# Patient Record
Sex: MALE | Race: WHITE | NOT HISPANIC OR LATINO | Employment: UNEMPLOYED | URBAN - NONMETROPOLITAN AREA
[De-identification: names, ages, dates, MRNs, and addresses within clinical notes are randomized per-mention and may not be internally consistent; named-entity substitution may affect disease eponyms.]

---
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
## 2021-01-14 RX ORDER — PROPRANOLOL HYDROCHLORIDE 10 MG/1
TABLET ORAL
Qty: 60 TABLET | Refills: 1 | OUTPATIENT
Start: 2021-01-14

## 2021-01-25 ENCOUNTER — OFFICE VISIT (OUTPATIENT)
Dept: PSYCHIATRY | Facility: CLINIC | Age: 21
End: 2021-01-25

## 2021-01-25 VITALS
WEIGHT: 157 LBS | DIASTOLIC BLOOD PRESSURE: 61 MMHG | HEIGHT: 71 IN | BODY MASS INDEX: 21.98 KG/M2 | SYSTOLIC BLOOD PRESSURE: 123 MMHG | TEMPERATURE: 97.5 F | HEART RATE: 68 BPM

## 2021-01-25 DIAGNOSIS — Z79.899 MEDICATION MANAGEMENT: ICD-10-CM

## 2021-01-25 DIAGNOSIS — F84.0 HISTORY OF AUTISM SPECTRUM DISORDER: ICD-10-CM

## 2021-01-25 DIAGNOSIS — F39 MOOD DISORDER IN PARTIAL REMISSION (HCC): Primary | ICD-10-CM

## 2021-01-25 DIAGNOSIS — R45.1 AGITATION: ICD-10-CM

## 2021-01-25 DIAGNOSIS — F63.9 IMPULSE CONTROL DISORDER: ICD-10-CM

## 2021-01-25 PROCEDURE — 99214 OFFICE O/P EST MOD 30 MIN: CPT | Performed by: NURSE PRACTITIONER

## 2021-01-25 RX ORDER — PAROXETINE HYDROCHLORIDE 40 MG/1
40 TABLET, FILM COATED ORAL
Qty: 30 TABLET | Refills: 1 | Status: SHIPPED | OUTPATIENT
Start: 2021-01-25 | End: 2021-03-08 | Stop reason: SDUPTHER

## 2021-01-25 RX ORDER — ZIPRASIDONE HYDROCHLORIDE 40 MG/1
40 CAPSULE ORAL EVERY EVENING
Qty: 30 CAPSULE | Refills: 1 | Status: SHIPPED | OUTPATIENT
Start: 2021-01-25 | End: 2021-03-08 | Stop reason: SDUPTHER

## 2021-01-25 RX ORDER — DIVALPROEX SODIUM 500 MG/1
500 TABLET, EXTENDED RELEASE ORAL NIGHTLY
Qty: 30 TABLET | Refills: 2 | Status: SHIPPED | OUTPATIENT
Start: 2021-01-25 | End: 2021-03-08 | Stop reason: SDUPTHER

## 2021-01-25 NOTE — PROGRESS NOTES
"Subjective   Ismael Sal Jr. is a 20 y.o. male who arrives with his mother today for his 1 month follow up for medication  management    Chief Complaint:  Medication follow-up    History of Present Illness:   Ismael and his mom agree everything is going well.  Mom states she is not having to \"redirect\" Ismael as much.  Mom states Ismael  spends a lot of time on his phone.  She was planning to put him in a mentoring program, but Covid interfered. He has little opportunity for outside socialization but texts his friends and listens to music.  Ismael states his sleep is \"fine.\" Denies problems initiating and maintaining sleep.  Duration averages about 8 hours.  Denies nightmares.  Mom states his appetite has increased and he eats regular meals.  No weight gain or weight loss.  Continues to provide care to for his grandfather and nephews. Mom states he sometimes gets frustrated when babysitting his nephews. Denies  anger outbursts.  Ismael's PHQ score is 0.  He rates anxiety as 0/10, hopelessness 0/10.  Both mom and Ismael deny any needs that need further addressed at this time.     PHQ-9 Depression Screening  Little interest or pleasure in doing things? 0   Feeling down, depressed, or hopeless? 0   Trouble falling or staying asleep, or sleeping too much?     Feeling tired or having little energy?     Poor appetite or overeating?     Feeling bad about yourself - or that you are a failure or have let yourself or your family down?     Trouble concentrating on things, such as reading the newspaper or watching television?     Moving or speaking so slowly that other people could have noticed? Or the opposite - being so fidgety or restless that you have been moving around a lot more than usual?     Thoughts that you would be better off dead, or of hurting yourself in some way?     PHQ-9 Total Score 0   If you checked off any problems, how difficult have these problems made it for you to do your work, take care of things at home, or get " along with other people?       Medication treatment options discussed.  Aims test conducted. Fine Intermittent bilateral hand tremor observed.  Does not appear to have worsened since discontinuing propanolol.  BP has improved.  Ismael does not find the tremor bothersome.  Ismael states he has been taking only 500 mg of Depakote at night,  which seems to be controlling his agitation.  He continues to take Paxil 40 mg p.o. at bedtime and Geodon 40 mg p.o. at bedtime.  He denies medication side effects.  Mom and Ismael find his current medication regimen effective and prefer no medication changes to be made at this time.   Mom agrees to follow-up with provider in 6 weeks and agrees to call clinic if she feels he needs to be seen sooner.    He is a non-smoker.    Mau report reviewed and appropriate.  Ismael denies any alcohol or substance use.    The following portions of the patient's history were reviewed and updated as appropriate: allergies, current medications, past family history, past medical history, past social history, past surgical history and problem list.    Problem List:  Patient Active Problem List   Diagnosis   • Bipolar disorder (CMS/[facility])   • Depression with suicidal ideation   • MDD (major depressive disorder)   • Impulse control disorder     Allergy:   Allergies   Allergen Reactions   • Bee Venom Hives   • Sulfa Antibiotics Hives      Current Medications:   Current Outpatient Medications   Medication Sig Dispense Refill   • divalproex (DEPAKOTE ER) 500 MG 24 hr tablet Take 1 tablet by mouth Every Night. 30 tablet 2   • PARoxetine (PAXIL) 40 MG tablet Take 1 tablet by mouth every night at bedtime. 30 tablet 1   • ziprasidone (GEODON) 40 MG capsule Take 1 capsule by mouth Every Evening. With Food 30 capsule 1     No current facility-administered medications for this visit.      Review of Symptoms:    Review of Systems   Allergic/Immunologic: Positive for environmental allergies.   Neurological: Positive for  "memory problem.   Psychiatric/Behavioral: Positive for stress.   All other systems reviewed and are negative.    Physical Exam:   Blood pressure 123/61, pulse 68, temperature 97.5 °F (36.4 °C), height 180.3 cm (70.98\"), weight 71.2 kg (157 lb).  Body mass index is 21.91 kg/m².    Appearance: disheveled, casually dressed  Gait, Station, Strength: posture erect, gait steady    Mental Status Exam:   Hygiene:   fair  Cooperation:  Cooperative  Eye Contact:  Good  Psychomotor Behavior:  Appropriate  Affect:  Appropriate  Mood: normal  Hopelessness: Denies  Speech:  Normal  Thought Process:  Goal directed  Thought Content:  Normal  Suicidal:  None  Homicidal:  None  Hallucinations:  None  Delusion:  None  Memory:  Deficits  Orientation:  Person, Place, Time and Situation  Reliability:  fair  Insight:  Fair  Judgement:  Fair  Impulse Control:  Fair  Physical/Medical Issues:  No      PHQ-Score Total:  PHQ-9 Total Score: 0    Assessment/Plan   Diagnoses and all orders for this visit:    1. 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  Dispense: 30 tablet; Refill: 2  -     PARoxetine (PAXIL) 40 MG tablet; Take 1 tablet by mouth every night at bedtime.  Dispense: 30 tablet; Refill: 1  -     ziprasidone (GEODON) 40 MG capsule; Take 1 capsule by mouth Every Evening. With Food  Dispense: 30 capsule; Refill: 1    2. Agitation  -     divalproex (DEPAKOTE ER) 500 MG 24 hr tablet; Take 1 tablet by mouth Every Night.  Dispense: 30 tablet; Refill: 2  -     ziprasidone (GEODON) 40 MG capsule; Take 1 capsule by mouth Every Evening. With Food  Dispense: 30 capsule; Refill: 1    3. Impulse control disorder  -     divalproex (DEPAKOTE ER) 500 MG 24 hr tablet; Take 1 tablet by mouth Every Night.  Dispense: 30 tablet; Refill: 2  -     ziprasidone (GEODON) 40 MG capsule; Take 1 capsule by mouth Every Evening. With Food  Dispense: 30 capsule; " Refill: 1    4. History of autism spectrum disorder  -     divalproex (DEPAKOTE ER) 500 MG 24 hr tablet; Take 1 tablet by mouth Every Night.  Dispense: 30 tablet; Refill: 2    5. Medication management      Visit Diagnoses:    ICD-10-CM ICD-9-CM   1. Mood disorder in partial remission (CMS/[facility])  F39 296.99   2. Agitation  R45.1 307.9   3. Impulse control disorder  F63.9 312.30   4. History of autism spectrum disorder  F84.0 299.00   5. Medication management  Z79.899 V58.69     TREATMENT PLAN/GOALS:   Short Term  1. Pt will keep all appts for med mgt and psychotherapy  2. Pt will take medications as prescribed and report intolerable side effects    Long term:   1. Pt will exhibit emotional stability  2. Patient will be less impulsive    MEDICATION ISSUES:  Discussed medication options and treatment plan of prescribed medication as well as the risks, benefits, and side effects including potential falls, possible impaired driving and metabolic adversities among others. Patient is agreeable to call the office with any worsening of symptoms or onset of side effects. Patient is agreeable to call 911 or go to the nearest ER should he/she begin having SI/HI.     MEDS ORDERED DURING VISIT:  New Medications Ordered This Visit   Medications   • divalproex (DEPAKOTE ER) 500 MG 24 hr tablet     Sig: Take 1 tablet by mouth Every Night.     Dispense:  30 tablet     Refill:  2   • PARoxetine (PAXIL) 40 MG tablet     Sig: Take 1 tablet by mouth every night at bedtime.     Dispense:  30 tablet     Refill:  1   • ziprasidone (GEODON) 40 MG capsule     Sig: Take 1 capsule by mouth Every Evening. With Food     Dispense:  30 capsule     Refill:  1     Return in about 6 weeks (around 3/8/2021).           This document has been electronically signed by IDA Perez   January 25, 2021 12:47 EST    Part of this note may be an electronic transcription/translation of spoken language to printed text using the Dragon Dictation  System.

## 2021-03-08 ENCOUNTER — OFFICE VISIT (OUTPATIENT)
Dept: PSYCHIATRY | Facility: CLINIC | Age: 21
End: 2021-03-08

## 2021-03-08 VITALS
DIASTOLIC BLOOD PRESSURE: 67 MMHG | SYSTOLIC BLOOD PRESSURE: 102 MMHG | HEIGHT: 71 IN | WEIGHT: 157.4 LBS | BODY MASS INDEX: 22.04 KG/M2 | HEART RATE: 62 BPM

## 2021-03-08 DIAGNOSIS — G47.00 INSOMNIA, UNSPECIFIED TYPE: Primary | ICD-10-CM

## 2021-03-08 DIAGNOSIS — Z79.899 MEDICATION MANAGEMENT: ICD-10-CM

## 2021-03-08 DIAGNOSIS — F39 MOOD DISORDER IN PARTIAL REMISSION (HCC): ICD-10-CM

## 2021-03-08 DIAGNOSIS — F63.9 IMPULSE CONTROL DISORDER: ICD-10-CM

## 2021-03-08 DIAGNOSIS — R45.1 AGITATION: ICD-10-CM

## 2021-03-08 DIAGNOSIS — Z86.59 HISTORY OF INTELLECTUAL DISABILITY: ICD-10-CM

## 2021-03-08 DIAGNOSIS — F84.0 HISTORY OF AUTISM SPECTRUM DISORDER: ICD-10-CM

## 2021-03-08 PROCEDURE — 99214 OFFICE O/P EST MOD 30 MIN: CPT | Performed by: NURSE PRACTITIONER

## 2021-03-08 RX ORDER — DIVALPROEX SODIUM 500 MG/1
500 TABLET, EXTENDED RELEASE ORAL NIGHTLY
Qty: 30 TABLET | Refills: 2 | Status: SHIPPED | OUTPATIENT
Start: 2021-03-08 | End: 2021-05-03 | Stop reason: SDUPTHER

## 2021-03-08 RX ORDER — ZIPRASIDONE HYDROCHLORIDE 40 MG/1
40 CAPSULE ORAL EVERY EVENING
Qty: 30 CAPSULE | Refills: 2 | Status: SHIPPED | OUTPATIENT
Start: 2021-03-08 | End: 2021-05-03 | Stop reason: SDUPTHER

## 2021-03-08 RX ORDER — PAROXETINE HYDROCHLORIDE 40 MG/1
40 TABLET, FILM COATED ORAL
Qty: 30 TABLET | Refills: 2 | Status: SHIPPED | OUTPATIENT
Start: 2021-03-08 | End: 2021-05-03 | Stop reason: SDUPTHER

## 2021-03-08 RX ORDER — HYDROXYZINE PAMOATE 25 MG/1
25-50 CAPSULE ORAL 2 TIMES DAILY PRN
Qty: 30 CAPSULE | Refills: 0 | Status: SHIPPED | OUTPATIENT
Start: 2021-03-08 | End: 2021-07-26 | Stop reason: SDUPTHER

## 2021-03-08 NOTE — PROGRESS NOTES
"Subjective   Ismael Sal Jr. is a 20 y.o. male who arrives with his mother today for his 2 month follow up for medication  management    Chief Complaint:  Insomnia    History of Present Illness:   Mom and Ismael report Ismael seems to be doing well on his current medication regimen. Ismael states he has been unable to fall asleep before 6 am the last few nights.  Patient denies any change in stress level, home dynamics, or activity that might contribute to insomnia.  He is unable to identify any precipitating factors. He states hs appetite is good. Ismael's weight remains stable.  Mom denies any anger outbursts at home.  She states he sometimes recognizes he is talking \"with an attitude.\" He then goes to his room, cools off, and comes back and apologizes to them.  Ismael denies depression, anxiety, and hopelessness.  He rates each as 0/10 with 10 being the most severe. Provider inquired if Ismael had gotten in any trouble using the computer inappropriately as he had a few months ago. Mom and Ismael appear evasive and offer no details.    The following portions of the patient's history were reviewed and updated as appropriate: allergies, current medications, past family history, past medical history, past social history, past surgical history and problem list.    Problem List:  Patient Active Problem List   Diagnosis   • Bipolar disorder (CMS/HCC)   • Depression with suicidal ideation   • MDD (major depressive disorder)   • Impulse control disorder     Allergy:   Allergies   Allergen Reactions   • Bee Venom Hives   • Sulfa Antibiotics Hives      Current Medications:   Current Outpatient Medications   Medication Sig Dispense Refill   • divalproex (DEPAKOTE ER) 500 MG 24 hr tablet Take 1 tablet by mouth Every Night. 30 tablet 2   • PARoxetine (PAXIL) 40 MG tablet Take 1 tablet by mouth every night at bedtime. 30 tablet 1   • ziprasidone (GEODON) 40 MG capsule Take 1 capsule by mouth Every Evening. With Food 30 capsule 1     No " "current facility-administered medications for this visit.     Review of Symptoms:    Review of Systems   Allergic/Immunologic: Positive for environmental allergies.   Neurological: Positive for memory problem.   Psychiatric/Behavioral: Positive for sleep disturbance.   All other systems reviewed and are negative.    Physical Exam:   Blood pressure 102/67, pulse 62, height 180.3 cm (71\"), weight 71.4 kg (157 lb 6.4 oz).  Body mass index is 21.95 kg/m².    Appearance: well nourished, casually dressed, stained/dirty clothing  Gait, Station, Strength: posture erect, gait steady    Mental Status Exam:   Hygiene:   fair  Cooperation:  Cooperative  Eye Contact:  Good  Psychomotor Behavior:  Appropriate  Affect:  Appropriate  Mood: normal  Hopelessness: Denies  Speech:  Normal  Thought Process:  Goal directed  Thought Content:  Normal  Suicidal:  None  Homicidal:  None  Hallucinations:  None  Delusion:  None  Memory:  Deficits  Orientation:  Person, Place, Time and Situation  Reliability:  fair  Insight:  Fair  Judgement:  Fair  Impulse Control:  Fair  Physical/Medical Issues:  No      PHQ-Score Total:  PHQ-9 Total Score: 0    Assessment/Plan   Diagnoses and all orders for this visit:    1. Insomnia, unspecified type (Primary)  -     hydrOXYzine pamoate (VISTARIL) 25 MG capsule; Take 1-2 capsules by mouth 2 (Two) Times a Day As Needed for Anxiety (insomnia, anxiety).  Dispense: 30 capsule; Refill: 0    2. Mood disorder in partial remission (CMS/HCC)  -     divalproex (DEPAKOTE ER) 500 MG 24 hr tablet; Take 1 tablet by mouth Every Night.  Dispense: 30 tablet; Refill: 2  -     PARoxetine (PAXIL) 40 MG tablet; Take 1 tablet by mouth every night at bedtime.  Dispense: 30 tablet; Refill: 2  -     ziprasidone (GEODON) 40 MG capsule; Take 1 capsule by mouth Every Evening. With Food  Dispense: 30 capsule; Refill: 2    3. Impulse control disorder-improved  -     divalproex (DEPAKOTE ER) 500 MG 24 hr tablet; Take 1 tablet by mouth " Every Night.  Dispense: 30 tablet; Refill: 2  -     ziprasidone (GEODON) 40 MG capsule; Take 1 capsule by mouth Every Evening. With Food  Dispense: 30 capsule; Refill: 2    4. History of autism spectrum disorder  -     divalproex (DEPAKOTE ER) 500 MG 24 hr tablet; Take 1 tablet by mouth Every Night.  Dispense: 30 tablet; Refill: 2    5. History of intellectual disability    6. Agitation-improved  -     divalproex (DEPAKOTE ER) 500 MG 24 hr tablet; Take 1 tablet by mouth Every Night.  Dispense: 30 tablet; Refill: 2  -     ziprasidone (GEODON) 40 MG capsule; Take 1 capsule by mouth Every Evening. With Food  Dispense: 30 capsule; Refill: 2    7. Medication management  -     Lipid Panel; Future  -     Basic Metabolic Panel; Future  -     Thyroid Panel With TSH; Future  -     Vitamin B12 & Folate  -     CBC & Differential; Future  -     Vitamin D 1,25 Dihydroxy; Future  -     ECG 12 Lead; Future  -     Valproic Acid Level, Total      Ismael and his mom feel his mood, agitation, and impulsivity is well controlled with current medication regimen.  He denies medication side effects.  Bilateral hand tremor appears to be resolved for the most part.  Provider discussed the potential benefit benefits, risks, side effects of adding hydroxyzine pamoate as needed for insomnia.  Both mom and Ismael agree and believe it may provide benefit. Provider discussed the need to  Monitor labs for metabolic side effects associated with antipsychotic medication.  Provider will also order  EKG due to risk of QTC prolongation with Geodon. Patient and mom agree to have them completed Prior to next follow-up appointment. Patient is advised to take multivitamin containing folate, vitamin D3 1000 IUs daily with food.    AIMS test performed and shows no abnormal muscle movement    -Continue Depakote  mg at bedtime for mood stability  -Continue Geodon 40 mg at night for agitation and mood  -Continue Paxil 40 mg at night for depression,  anxiety  -Patient agrees to lab draw an EKG done prior to next visit.  He is reminded to be fasting  -Reports reviewed include Mau mina, [facility] NP notes dated 12/14/2020, 1/23/2021. Labs from 11/20, EKG 2019  - Ismael is a nonsmoker    Visit Diagnoses:    ICD-10-CM ICD-9-CM   1. Mood disorder in partial remission (CMS/HCC)  F39 296.99   2. Impulse control disorder  F63.9 312.30   3. History of autism spectrum disorder  F84.0 299.00   4. History of intellectual disability  Z86.59 V11.8   5. Medication management  Z79.899 V58.69   6. Agitation  R45.1 307.9   7. Insomnia, unspecified type  G47.00 780.52     TREATMENT PLAN/GOALS:   Short Term  1. Pt will keep all appts for med mgt and psychotherapy  2. Pt will take medications as prescribed and report intolerable side effects    Long term:   1. Pt will exhibit emotional stability  2. Patient will be less impulsive    MEDICATION ISSUES:  Discussed medication options and treatment plan of prescribed medication as well as the risks, benefits, and side effects including potential falls, possible impaired driving and metabolic adversities among others. Patient is agreeable to call the office with any worsening of symptoms or onset of side effects. Patient is agreeable to call 911 or go to the nearest ER should he/she begin having SI/HI.     MEDS ORDERED DURING VISIT:  New Medications Ordered This Visit   Medications   • divalproex (DEPAKOTE ER) 500 MG 24 hr tablet     Sig: Take 1 tablet by mouth Every Night.     Dispense:  30 tablet     Refill:  2   • PARoxetine (PAXIL) 40 MG tablet     Sig: Take 1 tablet by mouth every night at bedtime.     Dispense:  30 tablet     Refill:  2   • ziprasidone (GEODON) 40 MG capsule     Sig: Take 1 capsule by mouth Every Evening. With Food     Dispense:  30 capsule     Refill:  2   • hydrOXYzine pamoate (VISTARIL) 25 MG capsule     Sig: Take 1-2 capsules by mouth 2 (Two) Times a Day As Needed for Anxiety (insomnia, anxiety).     Dispense:  30  capsule     Refill:  0     Return in about 2 months (around 5/8/2021).           This document has been electronically signed by IDA Perez   March 8, 2021 10:24 EST    Part of this note may be an electronic transcription/translation of spoken language to printed text using the Dragon Dictation System.

## 2021-05-03 ENCOUNTER — OFFICE VISIT (OUTPATIENT)
Dept: PSYCHIATRY | Facility: CLINIC | Age: 21
End: 2021-05-03

## 2021-05-03 VITALS
WEIGHT: 155.8 LBS | BODY MASS INDEX: 21.81 KG/M2 | SYSTOLIC BLOOD PRESSURE: 105 MMHG | HEIGHT: 71 IN | HEART RATE: 54 BPM | DIASTOLIC BLOOD PRESSURE: 64 MMHG

## 2021-05-03 DIAGNOSIS — Z79.899 MEDICATION MANAGEMENT: ICD-10-CM

## 2021-05-03 DIAGNOSIS — G47.00 INSOMNIA, UNSPECIFIED TYPE: ICD-10-CM

## 2021-05-03 DIAGNOSIS — F39 MOOD DISORDER IN PARTIAL REMISSION (HCC): Primary | ICD-10-CM

## 2021-05-03 DIAGNOSIS — F63.9 IMPULSE CONTROL DISORDER: ICD-10-CM

## 2021-05-03 DIAGNOSIS — F84.0 HISTORY OF AUTISM SPECTRUM DISORDER: ICD-10-CM

## 2021-05-03 DIAGNOSIS — R45.1 AGITATION: ICD-10-CM

## 2021-05-03 PROCEDURE — 99214 OFFICE O/P EST MOD 30 MIN: CPT | Performed by: NURSE PRACTITIONER

## 2021-05-03 RX ORDER — DIVALPROEX SODIUM 500 MG/1
500 TABLET, EXTENDED RELEASE ORAL NIGHTLY
Qty: 30 TABLET | Refills: 2 | Status: SHIPPED | OUTPATIENT
Start: 2021-05-03 | End: 2021-07-26

## 2021-05-03 RX ORDER — ZIPRASIDONE HYDROCHLORIDE 40 MG/1
40 CAPSULE ORAL EVERY EVENING
Qty: 30 CAPSULE | Refills: 2 | Status: SHIPPED | OUTPATIENT
Start: 2021-05-03 | End: 2021-07-26

## 2021-05-03 RX ORDER — PAROXETINE HYDROCHLORIDE 40 MG/1
40 TABLET, FILM COATED ORAL
Qty: 30 TABLET | Refills: 2 | Status: SHIPPED | OUTPATIENT
Start: 2021-05-03 | End: 2021-07-26 | Stop reason: SDUPTHER

## 2021-05-03 NOTE — PROGRESS NOTES
Subjective   Ismael Sal Jr. is a 20 y.o. male who arrives with his mother today for his 2 month follow up for medication  Management.  He arrives with his mother, Rebekah Sal, who provides additional assessment information    Chief Complaint:  Insomnia    History of Present Illness: Patient reports babysitting his nephews is driving him crazy.  They are ages 2 and 6 and often misbehave and do not go to sleep until 2 am. Mom states she sometimes has to redirect Ismael because he is blunt and has an attitude.  He continues to have intermittently disrupted sleep and often does not start his days until noon.  Mom states he has difficulty falling asleep because he naps during the day. Patient states he naps because his nephew keeps him awake at night. Nonmedicinal methods used to improve sleep were discussed at length.These include benefits of decreased caffeine consumption, avoiding screen two hours prior to sleep, sleeping in a dark room, avoid daytime naps,  use bed only for sleeping, and using  increasing daytime activity as permitted.  Patient reports he sometimes goes on  walks to take a break. He has no problems with appetite.  His weight remains stable.  His PHQ score is 4.  He rates anxiety and hopelessness as 0/10 with 10 being the worst.  Ismael is excited because he might have a boy coming over.     The following portions of the patient's history were reviewed and updated as appropriate: allergies, current medications, past family history, past medical history, past social history, past surgical history and problem list.    Problem List:  Patient Active Problem List   Diagnosis   • Bipolar disorder (CMS/HCC)   • Depression with suicidal ideation   • MDD (major depressive disorder)   • Impulse control disorder     Allergy:   Allergies   Allergen Reactions   • Bee Venom Hives   • Sulfa Antibiotics Hives      Current Medications:   Current Outpatient Medications   Medication Sig Dispense Refill   • divalproex  "(DEPAKOTE ER) 500 MG 24 hr tablet Take 1 tablet by mouth Every Night. 30 tablet 2   • PARoxetine (PAXIL) 40 MG tablet Take 1 tablet by mouth every night at bedtime. 30 tablet 2   • ziprasidone (GEODON) 40 MG capsule Take 1 capsule by mouth Every Evening. With Food 30 capsule 2   • hydrOXYzine pamoate (VISTARIL) 25 MG capsule Take 1-2 capsules by mouth 2 (Two) Times a Day As Needed for Anxiety (insomnia, anxiety). 30 capsule 0     No current facility-administered medications for this visit.     Review of Symptoms:    Review of Systems   Allergic/Immunologic: Positive for environmental allergies.   Neurological: Positive for memory problem.   Psychiatric/Behavioral: Positive for decreased concentration, sleep disturbance and stress. Negative for hallucinations, self-injury, suicidal ideas and depressed mood. The patient is not nervous/anxious.    All other systems reviewed and are negative.    Physical Exam:   Blood pressure 105/64, pulse 54, height 180.3 cm (70.98\"), weight 70.7 kg (155 lb 12.8 oz).  Body mass index is 21.74 kg/m².    Appearance: Ismael is a 20 year old  adolescent male. He appears well nourished, casually dressed.    Gait, Station, Strength: posture erect, gait steady    Mental Status Exam:   Hygiene:   good  Cooperation:  Cooperative  Eye Contact:  Good  Psychomotor Behavior:  Appropriate  Affect:  Appropriate  Mood: normal  Hopelessness: Denies  Speech:  Normal  Thought Process:  Goal directed  Thought Content:  Normal  Suicidal:  None  Homicidal:  None  Hallucinations:  None  Delusion:  None  Memory:  Deficits  Orientation:  Person, Place, Time and Situation  Reliability:  fair  Insight:  Fair  Judgement:  Fair  Impulse Control:  Fair  Physical/Medical Issues:  No      PHQ-Score Total:  PHQ-9 Total Score: 4     .Patient scored a 4 on his PHQ-9  on 5/3/2021. Follow-up recommendations include: Prescribed antidepressant medication treatment.    Assessment/Plan   Diagnoses and all orders for " this visit:    1. 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  Dispense: 30 tablet; Refill: 2  -     ziprasidone (GEODON) 40 MG capsule; Take 1 capsule by mouth Every Evening. With Food  Dispense: 30 capsule; Refill: 2    2. Insomnia, unspecified type    3. Impulse control disorder-improved  -     divalproex (DEPAKOTE ER) 500 MG 24 hr tablet; Take 1 tablet by mouth Every Night.  Dispense: 30 tablet; Refill: 2  -     ziprasidone (GEODON) 40 MG capsule; Take 1 capsule by mouth Every Evening. With Food  Dispense: 30 capsule; Refill: 2    4. History of autism spectrum disorder  -     divalproex (DEPAKOTE ER) 500 MG 24 hr tablet; Take 1 tablet by mouth Every Night.  Dispense: 30 tablet; Refill: 2    5. Medication management  -     CBC & Differential; Future  -     Comprehensive Metabolic Panel; Future  -     Vitamin D 1,25 Dihydroxy; Future  -     Vitamin B12 & Folate  -     Hemoglobin A1c; Future  -     Valproic Acid Level, Total  -     Thyroid Panel With TSH; Future  -     Lipid Panel; Future    6. Agitation-improved      Ismael and his mom feel his mood, agitation, and impulsivity is well controlled with current medication regimen.  He denies medication side effects.  Bilateral hand tremor appears to be resolved for the most part.  Patient has not been using hydroxyzine for sleep. Provider discussed the need to  Monitor labs for metabolic side effects associated with antipsychotic medication.  Provider will also order  EKG due to risk of QTC prolongation with Geodon. Patient and mom agree to have them completed Prior to next follow-up, and may have them done today if time permits.  Last dose of Depakote was last night and he has not eaten for at least 6 hours.  Patient is advised to take multivitamin containing folate, vitamin D3 1000 IUs daily with food.    AIMS test performed and shows no abnormal muscle  movement    -Continue Depakote  mg at bedtime for mood stability  -Continue Geodon 40 mg at night for agitation and mood  -Continue Paxil 40 mg at night for depression, anxiety  -Patient agrees to lab draw an EKG done prior to next visit.  He is reminded to be fasting  -Reports reviewed include Mau mina, [facility] NP notes d Labs from 11/20, EKG 2019  - Ismael is a nonsmoker    Visit Diagnoses:    ICD-10-CM ICD-9-CM   1. Mood disorder in partial remission (CMS/[facility])  F39 296.99   2. Insomnia, unspecified type  G47.00 780.52   3. Impulse control disorder-improved  F63.9 312.30   4. History of autism spectrum disorder  F84.0 299.00   5. Medication management  Z79.899 V58.69   6. Agitation-improved  R45.1 307.9     TREATMENT PLAN/GOALS:   Short Term  1. Pt will keep all appts for med mgt and psychotherapy  2. Pt will take medications as prescribed and report intolerable side effects    Long term:   1. Pt will exhibit emotional stability  2. Patient will be less impulsive    MEDICATION ISSUES:  Discussed medication options and treatment plan of prescribed medication as well as the risks, benefits, and side effects including potential falls, possible impaired driving and metabolic adversities among others. Patient is agreeable to call the office with any worsening of symptoms or onset of side effects. Patient is agreeable to call 911 or go to the nearest ER should he/she begin having SI/HI.     MEDS ORDERED DURING VISIT:  New Medications Ordered This Visit   Medications   • divalproex (DEPAKOTE ER) 500 MG 24 hr tablet     Sig: Take 1 tablet by mouth Every Night.     Dispense:  30 tablet     Refill:  2   • ziprasidone (GEODON) 40 MG capsule     Sig: Take 1 capsule by mouth Every Evening. With Food     Dispense:  30 capsule     Refill:  2   • PARoxetine (PAXIL) 40 MG tablet     Sig: Take 1 tablet by mouth every night at bedtime.     Dispense:  30 tablet     Refill:  2     Return in about 3 months (around 8/3/2021).            This document has been electronically signed by IDA Perez   May 3, 2021 10:05 EDT    Part of this note may be an electronic transcription/translation of spoken language to printed text using the Dragon Dictation System.

## 2021-07-26 ENCOUNTER — OFFICE VISIT (OUTPATIENT)
Dept: PSYCHIATRY | Facility: CLINIC | Age: 21
End: 2021-07-26

## 2021-07-26 VITALS
DIASTOLIC BLOOD PRESSURE: 66 MMHG | WEIGHT: 157.4 LBS | HEIGHT: 71 IN | SYSTOLIC BLOOD PRESSURE: 108 MMHG | HEART RATE: 61 BPM | BODY MASS INDEX: 22.04 KG/M2

## 2021-07-26 DIAGNOSIS — G47.00 INSOMNIA, UNSPECIFIED TYPE: ICD-10-CM

## 2021-07-26 DIAGNOSIS — F39 MOOD DISORDER IN FULL REMISSION (HCC): ICD-10-CM

## 2021-07-26 DIAGNOSIS — F63.9 IMPULSE CONTROL DISORDER: Primary | ICD-10-CM

## 2021-07-26 DIAGNOSIS — F84.0 HISTORY OF AUTISM SPECTRUM DISORDER: ICD-10-CM

## 2021-07-26 DIAGNOSIS — R41.83 BORDERLINE INTELLECTUAL FUNCTIONING: ICD-10-CM

## 2021-07-26 DIAGNOSIS — Z79.899 MEDICATION MANAGEMENT: ICD-10-CM

## 2021-07-26 PROCEDURE — 99214 OFFICE O/P EST MOD 30 MIN: CPT | Performed by: NURSE PRACTITIONER

## 2021-07-26 RX ORDER — DIVALPROEX SODIUM 500 MG/1
500 TABLET, EXTENDED RELEASE ORAL 2 TIMES DAILY
Qty: 30 TABLET | Refills: 1 | Status: SHIPPED | OUTPATIENT
Start: 2021-07-26 | End: 2021-07-27

## 2021-07-26 RX ORDER — HYDROXYZINE PAMOATE 25 MG/1
25-50 CAPSULE ORAL 2 TIMES DAILY PRN
Qty: 60 CAPSULE | Refills: 0 | Status: SHIPPED | OUTPATIENT
Start: 2021-07-26 | End: 2021-08-26

## 2021-07-26 RX ORDER — PAROXETINE HYDROCHLORIDE 40 MG/1
40 TABLET, FILM COATED ORAL
Qty: 30 TABLET | Refills: 1 | Status: SHIPPED | OUTPATIENT
Start: 2021-07-26 | End: 2021-08-23 | Stop reason: SDUPTHER

## 2021-07-26 RX ORDER — ZIPRASIDONE HYDROCHLORIDE 40 MG/1
40 CAPSULE ORAL EVERY EVENING
Qty: 30 CAPSULE | Refills: 1 | Status: SHIPPED | OUTPATIENT
Start: 2021-07-26 | End: 2021-08-23 | Stop reason: SDUPTHER

## 2021-07-26 NOTE — PROGRESS NOTES
"Subjective   Ismael Sal Jr. is a 20 y.o. male who arrives with his mother, Rebekah Sal, today for his  follow up visit  for medication management.       Chief Complaint:  Insomnia, impulsivity, inappropriate behavior    History of Present Illness: Mom reports Ismael is going through a growth spurt and she is having to frequently \"redirect\"  how he talks to her.  Also states he is more curious about sex and does not use good judgment when talking to people on his phone.Mom states he is obsessed with his phone and does not go anywhere without it. He sends random people pictures over the phone and tells them where he lives. He recently went out on a date and called the person's employer to find out why he got fired. Mom does not feel he  understand the dangers of going to water park alone and randomly approaching strangers. She states he walks up to strangers and says hello. Patient denies this. Patient discussed his \"obsession\" with purchasing Tiffany Dagoberto dolls as a hobby.  He enjoys dressing them up and taking pictures with them.  Ismael encouraged to explore additional interests and hobbies that mom agrees to. Mom will not allow him to get a job because she does not feel he has the organizational skills to be successful. Mom  wants to get him a mentor and states Gail from [employer] has been working on finding him resources. He's been getting out of the house and running errands with dad and sister. Sleep pattern remains disrupted. Always falls asleep after midnight. No problems with appetite. Patient denies feeling depressed or anxious.  Denies SI/HI/AVH.  Ismael continues therapy at [facility] with Aureliano every week.    The following portions of the patient's history were reviewed and updated as appropriate: allergies, current medications, past family history, past medical history, past social history, past surgical history and problem list.    Problem List:  Patient Active " "Problem List   Diagnosis   • Bipolar disorder (CMS/[facility])   • Depression with suicidal ideation   • MDD (major depressive disorder)   • Impulse control disorder     Allergy:   Allergies   Allergen Reactions   • Bee Venom Hives   • Sulfa Antibiotics Hives      Current Medications:   Current Outpatient Medications   Medication Sig Dispense Refill   • divalproex (DEPAKOTE ER) 500 MG 24 hr tablet Take 1 tablet by mouth Every Night. 30 tablet 2   • hydrOXYzine pamoate (VISTARIL) 25 MG capsule Take 1-2 capsules by mouth 2 (Two) Times a Day As Needed for Anxiety (insomnia, anxiety). 30 capsule 0   • PARoxetine (PAXIL) 40 MG tablet Take 1 tablet by mouth every night at bedtime. 30 tablet 2   • ziprasidone (GEODON) 40 MG capsule Take 1 capsule by mouth Every Evening. With Food 30 capsule 2     No current facility-administered medications for this visit.     Review of Symptoms:    Review of Systems   Allergic/Immunologic: Positive for environmental allergies.   Neurological: Positive for memory problem.   Psychiatric/Behavioral: Positive for agitation, behavioral problems, decreased concentration, sleep disturbance and stress. Negative for hallucinations, self-injury, suicidal ideas and depressed mood. The patient is not nervous/anxious.    All other systems reviewed and are negative.    Physical Exam:   Blood pressure 108/66, pulse 61, height 180.3 cm (70.98\"), weight 71.4 kg (157 lb 6.4 oz).  Body mass index is 21.96 kg/m².    Appearance: Ismael is a 20 year old  adolescent male. He appears well nourished, casually dressed.    Gait, Station, Strength: posture erect, gait steady.  No signs of impairment, acute distress, abnormal muscle movements, motor tics or tremors    Mental Status Exam:   Hygiene:   good  Cooperation:  Cooperative  Eye Contact:  Good  Psychomotor Behavior:  Appropriate  Affect:  Appropriate  Mood: euthymic  Hopelessness: Denies  Speech:  Normal  Thought Process:  Goal directed  Thought Content:  " Mood congruent  Suicidal:  None  Homicidal:  None  Hallucinations:  None  Delusion:  None  Memory:  Deficits  Orientation:  Person, Place, Time and Situation  Reliability:  fair  Insight:  Fair  Judgement:  Fair  Impulse Control:  Fair  Physical/Medical Issues:  No         PHQ  .Patient scored a 4 on his PHQ-9  on 5/3/2021. Follow-up recommendations include: Prescribed antidepressant medication treatment.    Assessment/Plan   Diagnoses and all orders for this visit:    1. Impulse control disorder  (Primary)  -     divalproex (DEPAKOTE ER) 500 MG 24 hr tablet; Take 1 tablet by mouth 2 (two) times a day.  Dispense: 30 tablet; Refill: 1  -     ziprasidone (GEODON) 40 MG capsule; Take 1 capsule by mouth Every Evening. With Food  Dispense: 30 capsule; Refill: 1    2. Insomnia, unspecified type  -     ziprasidone (GEODON) 40 MG capsule; Take 1 capsule by mouth Every Evening. With Food  Dispense: 30 capsule; Refill: 1  -     hydrOXYzine pamoate (VISTARIL) 25 MG capsule; Take 1-2 capsules by mouth 2 (Two) Times a Day As Needed for Anxiety (insomnia, anxiety).  Dispense: 60 capsule; Refill: 0    3. Mood disorder in full remission (CMS/HCC)  -     divalproex (DEPAKOTE ER) 500 MG 24 hr tablet; Take 1 tablet by mouth 2 (two) times a day.  Dispense: 30 tablet; Refill: 1  -     ziprasidone (GEODON) 40 MG capsule; Take 1 capsule by mouth Every Evening. With Food  Dispense: 30 capsule; Refill: 1  -     PARoxetine (PAXIL) 40 MG tablet; Take 1 tablet by mouth every night at bedtime.  Dispense: 30 tablet; Refill: 1    4. History of autism spectrum disorder  -     divalproex (DEPAKOTE ER) 500 MG 24 hr tablet; Take 1 tablet by mouth 2 (two) times a day.  Dispense: 30 tablet; Refill: 1    5. Borderline intellectual functioning    6. Medication management      Patient takes medication as prescribed and denies medication side effects.  Hydroxyzine is effective  for insomnia. Mom requests medication to help with Ismael's impulsivity and  verbal defiance.  Medication treatment options discussed.  [facility] decision to increase Depakote ER to 500 mg twice daily and reassess next visit.  Importance of obtaining labs again discussed.  Mom agrees to have them drawn prior to next follow-up visit.  Both she and Ismael are aware to fast 8 hours prior to blood  draw and hold evening dose of Depakote. AIMS test performed and shows no abnormal muscle movement    -Increase Depakote  mg twice daily for mood stability  -Continue Geodon 40 mg at night for agitation and mood  -Continue Paxil 40 mg at night for depression, anxiety  -Continue hydroxyzine 25-50 mg twice daily as needed for anxiety insomnia  -Mom encouraged that restrict phone time  -Patient agrees to lab draw an EKG done prior to next visit.  He is reminded to be fasting  -Reports reviewed include [facility] report, notes, labs, EKG  - Ismael is a nonsmoker    Visit Diagnoses:    ICD-10-CM ICD-9-CM   1. Impulse control disorder   F63.9 312.30   2. Insomnia, unspecified type  G47.00 780.52   3. Mood disorder in full remission (CMS/[facility])  F39 296.99   4. History of autism spectrum disorder  F84.0 299.00   5. Borderline intellectual functioning  R41.83 V62.89   6. Medication management  Z79.899 V58.69     TREATMENT PLAN/GOALS:   Short Term  1. Pt will keep all appts for med mgt and psychotherapy  2. Pt will take medications as prescribed and report intolerable side effects    Long term:   1. Pt will exhibit emotional stability  2. Patient will be less impulsive    MEDICATION ISSUES:  Discussed medication options and treatment plan of prescribed medication as well as the risks, benefits, and side effects including potential falls, possible impaired driving and metabolic adversities among others. Patient is agreeable to call the office with any worsening of symptoms or onset of side effects. Patient is agreeable to call 911 or go to the nearest ER should he/she begin having SI/HI.     MEDS ORDERED DURING  VISIT:  New Medications Ordered This Visit   Medications   • divalproex (DEPAKOTE ER) 500 MG 24 hr tablet     Sig: Take 1 tablet by mouth 2 (two) times a day.     Dispense:  30 tablet     Refill:  1   • ziprasidone (GEODON) 40 MG capsule     Sig: Take 1 capsule by mouth Every Evening. With Food     Dispense:  30 capsule     Refill:  1   • PARoxetine (PAXIL) 40 MG tablet     Sig: Take 1 tablet by mouth every night at bedtime.     Dispense:  30 tablet     Refill:  1   • hydrOXYzine pamoate (VISTARIL) 25 MG capsule     Sig: Take 1-2 capsules by mouth 2 (Two) Times a Day As Needed for Anxiety (insomnia, anxiety).     Dispense:  60 capsule     Refill:  0     Return in about 4 weeks (around 8/23/2021).           This document has been electronically signed by IDA Perez   July 26, 2021 10:02 EDT    Part of this note may be an electronic transcription/translation of spoken language to printed text using the Dragon Dictation System.

## 2021-07-27 ENCOUNTER — TELEPHONE (OUTPATIENT)
Dept: PSYCHIATRY | Facility: CLINIC | Age: 21
End: 2021-07-27

## 2021-07-27 DIAGNOSIS — F63.9 IMPULSE CONTROL DISORDER: ICD-10-CM

## 2021-07-27 DIAGNOSIS — F84.0 HISTORY OF AUTISM SPECTRUM DISORDER: ICD-10-CM

## 2021-07-27 DIAGNOSIS — F39 MOOD DISORDER IN FULL REMISSION (HCC): ICD-10-CM

## 2021-07-27 RX ORDER — DIVALPROEX SODIUM 500 MG/1
500 TABLET, EXTENDED RELEASE ORAL 2 TIMES DAILY
Qty: 60 TABLET | Refills: 1 | Status: SHIPPED | OUTPATIENT
Start: 2021-07-27 | End: 2021-08-23 | Stop reason: SDUPTHER

## 2021-07-27 NOTE — TELEPHONE ENCOUNTER
Pharmacist stated that Depakote was written to take 2 times a day, but prescription was only sent for a quantity of 30.

## 2021-08-16 ENCOUNTER — LAB (OUTPATIENT)
Dept: LAB | Facility: HOSPITAL | Age: 21
End: 2021-08-16

## 2021-08-16 DIAGNOSIS — Z79.899 MEDICATION MANAGEMENT: ICD-10-CM

## 2021-08-16 LAB
ALBUMIN SERPL-MCNC: 4.8 G/DL (ref 3.5–5.2)
ALBUMIN/GLOB SERPL: 1.9 G/DL
ALP SERPL-CCNC: 46 U/L (ref 39–117)
ALT SERPL W P-5'-P-CCNC: 11 U/L (ref 1–41)
ANION GAP SERPL CALCULATED.3IONS-SCNC: 9 MMOL/L (ref 5–15)
AST SERPL-CCNC: 13 U/L (ref 1–40)
BASOPHILS # BLD AUTO: 0.03 10*3/MM3 (ref 0–0.2)
BASOPHILS NFR BLD AUTO: 0.5 % (ref 0–1.5)
BILIRUB SERPL-MCNC: 0.3 MG/DL (ref 0–1.2)
BUN SERPL-MCNC: 15 MG/DL (ref 6–20)
BUN/CREAT SERPL: 14.4 (ref 7–25)
CALCIUM SPEC-SCNC: 9.6 MG/DL (ref 8.6–10.5)
CHLORIDE SERPL-SCNC: 103 MMOL/L (ref 98–107)
CHOLEST SERPL-MCNC: 145 MG/DL (ref 0–200)
CO2 SERPL-SCNC: 29 MMOL/L (ref 22–29)
CREAT SERPL-MCNC: 1.04 MG/DL (ref 0.76–1.27)
DEPRECATED RDW RBC AUTO: 39.7 FL (ref 37–54)
EOSINOPHIL # BLD AUTO: 0.11 10*3/MM3 (ref 0–0.4)
EOSINOPHIL NFR BLD AUTO: 1.9 % (ref 0.3–6.2)
ERYTHROCYTE [DISTWIDTH] IN BLOOD BY AUTOMATED COUNT: 12.6 % (ref 12.3–15.4)
FOLATE SERPL-MCNC: 12.1 NG/ML (ref 4.78–24.2)
GFR SERPL CREATININE-BSD FRML MDRD: 92 ML/MIN/1.73
GLOBULIN UR ELPH-MCNC: 2.5 GM/DL
GLUCOSE SERPL-MCNC: 83 MG/DL (ref 65–99)
HBA1C MFR BLD: 4.8 % (ref 4.8–5.6)
HCT VFR BLD AUTO: 45.3 % (ref 37.5–51)
HDLC SERPL-MCNC: 47 MG/DL (ref 40–60)
HGB BLD-MCNC: 15.2 G/DL (ref 13–17.7)
IMM GRANULOCYTES # BLD AUTO: 0.02 10*3/MM3 (ref 0–0.05)
IMM GRANULOCYTES NFR BLD AUTO: 0.3 % (ref 0–0.5)
LDLC SERPL CALC-MCNC: 84 MG/DL (ref 0–100)
LDLC/HDLC SERPL: 1.78 {RATIO}
LYMPHOCYTES # BLD AUTO: 2.69 10*3/MM3 (ref 0.7–3.1)
LYMPHOCYTES NFR BLD AUTO: 46.9 % (ref 19.6–45.3)
MCH RBC QN AUTO: 29.3 PG (ref 26.6–33)
MCHC RBC AUTO-ENTMCNC: 33.6 G/DL (ref 31.5–35.7)
MCV RBC AUTO: 87.3 FL (ref 79–97)
MONOCYTES # BLD AUTO: 0.46 10*3/MM3 (ref 0.1–0.9)
MONOCYTES NFR BLD AUTO: 8 % (ref 5–12)
NEUTROPHILS NFR BLD AUTO: 2.43 10*3/MM3 (ref 1.7–7)
NEUTROPHILS NFR BLD AUTO: 42.4 % (ref 42.7–76)
NRBC BLD AUTO-RTO: 0 /100 WBC (ref 0–0.2)
PLATELET # BLD AUTO: 117 10*3/MM3 (ref 140–450)
PMV BLD AUTO: 11.9 FL (ref 6–12)
POTASSIUM SERPL-SCNC: 4.1 MMOL/L (ref 3.5–5.2)
PROT SERPL-MCNC: 7.3 G/DL (ref 6–8.5)
RBC # BLD AUTO: 5.19 10*6/MM3 (ref 4.14–5.8)
SODIUM SERPL-SCNC: 141 MMOL/L (ref 136–145)
T-UPTAKE NFR SERPL: 1.04 TBI (ref 0.8–1.3)
T4 SERPL-MCNC: 4.95 MCG/DL (ref 4.5–11.7)
TRIGL SERPL-MCNC: 72 MG/DL (ref 0–150)
TSH SERPL DL<=0.05 MIU/L-ACNC: 6.57 UIU/ML (ref 0.27–4.2)
VALPROATE SERPL-MCNC: 37 MCG/ML (ref 50–125)
VIT B12 BLD-MCNC: 654 PG/ML (ref 211–946)
VLDLC SERPL-MCNC: 14 MG/DL (ref 5–40)
WBC # BLD AUTO: 5.74 10*3/MM3 (ref 3.4–10.8)

## 2021-08-16 PROCEDURE — 80061 LIPID PANEL: CPT

## 2021-08-16 PROCEDURE — 82607 VITAMIN B-12: CPT | Performed by: NURSE PRACTITIONER

## 2021-08-16 PROCEDURE — 84436 ASSAY OF TOTAL THYROXINE: CPT

## 2021-08-16 PROCEDURE — 84443 ASSAY THYROID STIM HORMONE: CPT

## 2021-08-16 PROCEDURE — 85025 COMPLETE CBC W/AUTO DIFF WBC: CPT

## 2021-08-16 PROCEDURE — 84479 ASSAY OF THYROID (T3 OR T4): CPT

## 2021-08-16 PROCEDURE — 82652 VIT D 1 25-DIHYDROXY: CPT

## 2021-08-16 PROCEDURE — 80164 ASSAY DIPROPYLACETIC ACD TOT: CPT | Performed by: NURSE PRACTITIONER

## 2021-08-16 PROCEDURE — 80053 COMPREHEN METABOLIC PANEL: CPT

## 2021-08-16 PROCEDURE — 83036 HEMOGLOBIN GLYCOSYLATED A1C: CPT

## 2021-08-16 PROCEDURE — 36415 COLL VENOUS BLD VENIPUNCTURE: CPT | Performed by: NURSE PRACTITIONER

## 2021-08-16 PROCEDURE — 82746 ASSAY OF FOLIC ACID SERUM: CPT | Performed by: NURSE PRACTITIONER

## 2021-08-18 LAB — 1,25(OH)2D SERPL-MCNC: 44.3 PG/ML (ref 19.9–79.3)

## 2021-08-23 ENCOUNTER — OFFICE VISIT (OUTPATIENT)
Dept: PSYCHIATRY | Facility: CLINIC | Age: 21
End: 2021-08-23

## 2021-08-23 VITALS
BODY MASS INDEX: 22.4 KG/M2 | DIASTOLIC BLOOD PRESSURE: 71 MMHG | HEART RATE: 53 BPM | SYSTOLIC BLOOD PRESSURE: 106 MMHG | HEIGHT: 71 IN | WEIGHT: 160 LBS

## 2021-08-23 DIAGNOSIS — G47.00 INSOMNIA, UNSPECIFIED TYPE: ICD-10-CM

## 2021-08-23 DIAGNOSIS — F39 MOOD DISORDER IN FULL REMISSION (HCC): ICD-10-CM

## 2021-08-23 DIAGNOSIS — R41.83 BORDERLINE INTELLECTUAL FUNCTIONING: ICD-10-CM

## 2021-08-23 DIAGNOSIS — Z79.899 MEDICATION MANAGEMENT: ICD-10-CM

## 2021-08-23 DIAGNOSIS — F84.0 HISTORY OF AUTISM SPECTRUM DISORDER: ICD-10-CM

## 2021-08-23 DIAGNOSIS — F63.9 IMPULSE CONTROL DISORDER: Primary | ICD-10-CM

## 2021-08-23 PROCEDURE — 99214 OFFICE O/P EST MOD 30 MIN: CPT | Performed by: NURSE PRACTITIONER

## 2021-08-23 RX ORDER — PAROXETINE HYDROCHLORIDE 40 MG/1
40 TABLET, FILM COATED ORAL
Qty: 30 TABLET | Refills: 1 | Status: SHIPPED | OUTPATIENT
Start: 2021-08-23 | End: 2021-10-04

## 2021-08-23 RX ORDER — DIVALPROEX SODIUM 500 MG/1
500 TABLET, EXTENDED RELEASE ORAL 2 TIMES DAILY
Qty: 60 TABLET | Refills: 1 | Status: SHIPPED | OUTPATIENT
Start: 2021-08-23 | End: 2021-10-04 | Stop reason: SDUPTHER

## 2021-08-23 RX ORDER — ZIPRASIDONE HYDROCHLORIDE 40 MG/1
40 CAPSULE ORAL EVERY EVENING
Qty: 30 CAPSULE | Refills: 1 | Status: SHIPPED | OUTPATIENT
Start: 2021-08-23 | End: 2021-10-04 | Stop reason: SDUPTHER

## 2021-08-23 NOTE — PROGRESS NOTES
"Subjective   Ismael Sal Jr. is a 20 y.o. male who arrives with his mother, Rebekah Sal, today for his  follow up visit  for medication management.       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  Ismael states sleep and appetite are  \"fine\".   He rates anxiety as 0/10 and depression as 0/10 on 0-10 scale with 10 being the worst. Denies SI/HI/AVH. Mom states Ismael's therapist Gail continues to search for resources/mentor for Ismael. States Ismael was turned down for [employer] because he knew how to operate a microwave.  Mom states the time he is allowed on the internet remains restricted. Mom denies additional  behavioral concerns.      The following portions of the patient's history were reviewed and updated as appropriate: allergies, current medications, past family history, past medical history, past social history, past surgical history and problem list.    Problem List:  Patient Active Problem List   Diagnosis   • Bipolar disorder (CMS/HCC)   • Depression with suicidal ideation   • MDD (major depressive disorder)   • Impulse control disorder     Allergy:   Allergies   Allergen Reactions   • Bee Venom Hives   • Sulfa Antibiotics Hives      Current Medications:   Current Outpatient Medications   Medication Sig Dispense Refill   • divalproex (DEPAKOTE ER) 500 MG 24 hr tablet Take 1 tablet by mouth 2 (two) times a day. 60 tablet 1   • hydrOXYzine pamoate (VISTARIL) 25 MG capsule Take 1-2 capsules by mouth 2 (Two) Times a Day As Needed for Anxiety (insomnia, anxiety). 60 capsule 0   • PARoxetine (PAXIL) 40 MG tablet Take 1 tablet by mouth every night at bedtime. 30 tablet 1   • ziprasidone (GEODON) 40 MG capsule Take 1 capsule by mouth Every Evening. " "With Food 30 capsule 1     No current facility-administered medications for this visit.     Review of Symptoms:    Review of Systems   Allergic/Immunologic: Positive for environmental allergies.   Neurological: Positive for memory problem.   Psychiatric/Behavioral: Positive for decreased concentration, sleep disturbance and stress. Negative for agitation, behavioral problems, hallucinations, self-injury, suicidal ideas and depressed mood. The patient is not nervous/anxious.    All other systems reviewed and are negative.    Physical Exam:   .Physical Exam  Vitals reviewed.   Constitutional:       Appearance: He is normal weight.   Neurological:      Mental Status: He is alert.      Gait: Gait is intact.   Psychiatric:         Attention and Perception: He is inattentive.         Mood and Affect: Affect is blunt.         Behavior: Behavior is withdrawn.         Thought Content: Thought content is not paranoid. Thought content does not include homicidal or suicidal ideation.         Cognition and Memory: Cognition is impaired. Memory is impaired.         Judgment: Judgment is impulsive.       Blood pressure 106/71, pulse 53, height 180.3 cm (70.98\"), weight 72.6 kg (160 lb).  Body mass index is 22.33 kg/m².    Appearance: Ismael is a 20 year old  adolescent male. He appears well nourished, casually dressed. BMI 22.33    Gait, Station, Strength: posture erect, gait steady.  No signs of impairment, acute distress, abnormal muscle movements, motor tics or tremors    Mental Status Exam:   Hygiene:   good  Cooperation:  disengaged  Eye Contact:  Fair  Psychomotor Behavior:  Appropriate  Affect:  Restricted  Mood: depressed  Hopelessness: Denies  Speech:  Minimal and brief answers provided to assessment questions  Thought Process:  appropriately answers providers questions  Thought Content:  Unable to demonstrate and offers limted verbal response  Suicidal:  None  Homicidal:  None  Hallucinations:  None  Delusion:  " None  Memory:  Deficits  Orientation:  Person, Place, Time and Situation  Reliability:  fair  Insight:  Fair  Judgement:  Impaired  Impulse Control:  Impaired  Physical/Medical Issues:  No      PHQ  .Patient scored a 4 on his PHQ-9  on 5/3/2021. Follow-up recommendations include: Prescribed antidepressant medication treatment.    Lab on 08/16/2021   Component Date Value Ref Range Status   • Glucose 08/16/2021 83  65 - 99 mg/dL Final   • BUN 08/16/2021 15  6 - 20 mg/dL Final   • Creatinine 08/16/2021 1.04  0.76 - 1.27 mg/dL Final   • Sodium 08/16/2021 141  136 - 145 mmol/L Final   • Potassium 08/16/2021 4.1  3.5 - 5.2 mmol/L Final   • Chloride 08/16/2021 103  98 - 107 mmol/L Final   • CO2 08/16/2021 29.0  22.0 - 29.0 mmol/L Final   • Calcium 08/16/2021 9.6  8.6 - 10.5 mg/dL Final   • Total Protein 08/16/2021 7.3  6.0 - 8.5 g/dL Final   • Albumin 08/16/2021 4.80  3.50 - 5.20 g/dL Final   • ALT (SGPT) 08/16/2021 11  1 - 41 U/L Final   • AST (SGOT) 08/16/2021 13  1 - 40 U/L Final   • Alkaline Phosphatase 08/16/2021 46  39 - 117 U/L Final   • Total Bilirubin 08/16/2021 0.3  0.0 - 1.2 mg/dL Final   • eGFR Non  Amer 08/16/2021 92  >60 mL/min/1.73 Final   • Globulin 08/16/2021 2.5  gm/dL Final   • A/G Ratio 08/16/2021 1.9  g/dL Final   • BUN/Creatinine Ratio 08/16/2021 14.4  7.0 - 25.0 Final   • Anion Gap 08/16/2021 9.0  5.0 - 15.0 mmol/L Final   • 1,25-Dihydroxy, Vitamin D 08/16/2021 44.3  19.9 - 79.3 pg/mL Final   • Hemoglobin A1C 08/16/2021 4.80  4.80 - 5.60 % Final   • TSH 08/16/2021 6.570* 0.270 - 4.200 uIU/mL Final   • T Uptake 08/16/2021 1.04  0.80 - 1.30 TBI Final   • T4, Total 08/16/2021 4.95  4.50 - 11.70 mcg/dL Final    T4 results may be falsely increased if patient taking Biotin.   • Total Cholesterol 08/16/2021 145  0 - 200 mg/dL Final   • Triglycerides 08/16/2021 72  0 - 150 mg/dL Final   • HDL Cholesterol 08/16/2021 47  40 - 60 mg/dL Final   • LDL Cholesterol  08/16/2021 84  0 - 100 mg/dL Final   •  VLDL Cholesterol 08/16/2021 14  5 - 40 mg/dL Final   • LDL/HDL Ratio 08/16/2021 1.78   Final   • WBC 08/16/2021 5.74  3.40 - 10.80 10*3/mm3 Final   • RBC 08/16/2021 5.19  4.14 - 5.80 10*6/mm3 Final   • Hemoglobin 08/16/2021 15.2  13.0 - 17.7 g/dL Final   • Hematocrit 08/16/2021 45.3  37.5 - 51.0 % Final   • MCV 08/16/2021 87.3  79.0 - 97.0 fL Final   • MCH 08/16/2021 29.3  26.6 - 33.0 pg Final   • MCHC 08/16/2021 33.6  31.5 - 35.7 g/dL Final   • RDW 08/16/2021 12.6  12.3 - 15.4 % Final   • RDW-SD 08/16/2021 39.7  37.0 - 54.0 fl Final   • MPV 08/16/2021 11.9  6.0 - 12.0 fL Final   • Platelets 08/16/2021 117* 140 - 450 10*3/mm3 Final   • Neutrophil % 08/16/2021 42.4* 42.7 - 76.0 % Final   • Lymphocyte % 08/16/2021 46.9* 19.6 - 45.3 % Final   • Monocyte % 08/16/2021 8.0  5.0 - 12.0 % Final   • Eosinophil % 08/16/2021 1.9  0.3 - 6.2 % Final   • Basophil % 08/16/2021 0.5  0.0 - 1.5 % Final   • Immature Grans % 08/16/2021 0.3  0.0 - 0.5 % Final   • Neutrophils, Absolute 08/16/2021 2.43  1.70 - 7.00 10*3/mm3 Final   • Lymphocytes, Absolute 08/16/2021 2.69  0.70 - 3.10 10*3/mm3 Final   • Monocytes, Absolute 08/16/2021 0.46  0.10 - 0.90 10*3/mm3 Final   • Eosinophils, Absolute 08/16/2021 0.11  0.00 - 0.40 10*3/mm3 Final   • Basophils, Absolute 08/16/2021 0.03  0.00 - 0.20 10*3/mm3 Final   • Immature Grans, Absolute 08/16/2021 0.02  0.00 - 0.05 10*3/mm3 Final   • nRBC 08/16/2021 0.0  0.0 - 0.2 /100 WBC Final     Assessment/Plan   Diagnoses and all orders for this visit:    1. Impulse control disorder  (Primary)  -     divalproex (DEPAKOTE ER) 500 MG 24 hr tablet; Take 1 tablet by mouth 2 (two) times a day.  Dispense: 60 tablet; Refill: 1  -     ziprasidone (GEODON) 40 MG capsule; Take 1 capsule by mouth Every Evening. With Food  Dispense: 30 capsule; Refill: 1    2. History of autism spectrum disorder  -     divalproex (DEPAKOTE ER) 500 MG 24 hr tablet; Take 1 tablet by mouth 2 (two) times a day.  Dispense: 60 tablet; Refill:  1    3. Mood disorder in full remission (CMS/HCC)  -     divalproex (DEPAKOTE ER) 500 MG 24 hr tablet; Take 1 tablet by mouth 2 (two) times a day.  Dispense: 60 tablet; Refill: 1  -     PARoxetine (PAXIL) 40 MG tablet; Take 1 tablet by mouth every night at bedtime.  Dispense: 30 tablet; Refill: 1  -     ziprasidone (GEODON) 40 MG capsule; Take 1 capsule by mouth Every Evening. With Food  Dispense: 30 capsule; Refill: 1    4. Insomnia, unspecified type  -     ziprasidone (GEODON) 40 MG capsule; Take 1 capsule by mouth Every Evening. With Food  Dispense: 30 capsule; Refill: 1    5. Borderline intellectual functioning    6. Medication management      Mom states Ismael takes medication as prescribed. He denies medication side effects. Labs reviewed. Abnormal thyroid results discussed. Mom encouraged to make appoint with PCP for further evaluation. She states her grandmother, father and aunt has a history of tyroid cancer.  Depakote level 37. Mom agrees to maintain current since behavior symptoms  are controlled at this time.     -Continue Depakote  mg twice daily for mood stability  -Continue Geodon 40 mg at night for agitation and mood  -Continue Paxil 40 mg at night for depression, anxiety  -Continue hydroxyzine 25-50 mg twice daily as needed for anxiety insomnia  -Mom encouraged to pursue opportunities for Ismael to socially engage   -Reports reviewed include Mau report, notes, labs   - Ismael is a nonsmoker    Visit Diagnoses:    ICD-10-CM ICD-9-CM   1. Impulse control disorder   F63.9 312.30   2. History of autism spectrum disorder  F84.0 299.00   3. Mood disorder in full remission (CMS/HCC)  F39 296.99   4. Insomnia, unspecified type  G47.00 780.52   5. Borderline intellectual functioning  R41.83 V62.89   6. Medication management  Z79.899 V58.69     TREATMENT PLAN/GOALS:   Short Term  1. Pt will keep all appts for med mgt and psychotherapy  2. Pt will take medications as prescribed and report intolerable side  effects    Long term:   1. Pt will exhibit emotional stability  2. Patient will be less impulsive    MEDICATION ISSUES:  Discussed medication options and treatment plan of prescribed medication as well as the risks, benefits, and side effects including potential falls, possible impaired driving and metabolic adversities among others. Patient/Mom is agreeable to call the office with any worsening of symptoms or onset of side effects. Mom is agreeable to call 911 or go to the nearest ER should he  begin having SI/HI.     MEDS ORDERED DURING VISIT:  New Medications Ordered This Visit   Medications   • divalproex (DEPAKOTE ER) 500 MG 24 hr tablet     Sig: Take 1 tablet by mouth 2 (two) times a day.     Dispense:  60 tablet     Refill:  1     Quantity corrected   • PARoxetine (PAXIL) 40 MG tablet     Sig: Take 1 tablet by mouth every night at bedtime.     Dispense:  30 tablet     Refill:  1   • ziprasidone (GEODON) 40 MG capsule     Sig: Take 1 capsule by mouth Every Evening. With Food     Dispense:  30 capsule     Refill:  1     Return in about 6 weeks (around 10/4/2021).           This document has been electronically signed by IDA Perez   August 23, 2021 08:48 EDT    Part of this note may be an electronic transcription/translation of spoken language to printed text using the Dragon Dictation System.

## 2021-08-26 DIAGNOSIS — G47.00 INSOMNIA, UNSPECIFIED TYPE: ICD-10-CM

## 2021-08-26 RX ORDER — HYDROXYZINE PAMOATE 25 MG/1
CAPSULE ORAL
Qty: 60 CAPSULE | Refills: 0 | Status: SHIPPED | OUTPATIENT
Start: 2021-08-26 | End: 2021-10-04

## 2021-09-30 DIAGNOSIS — G47.00 INSOMNIA, UNSPECIFIED TYPE: ICD-10-CM

## 2021-09-30 RX ORDER — HYDROXYZINE PAMOATE 25 MG/1
CAPSULE ORAL
Qty: 60 CAPSULE | Refills: 0 | OUTPATIENT
Start: 2021-09-30

## 2021-10-04 ENCOUNTER — OFFICE VISIT (OUTPATIENT)
Dept: PSYCHIATRY | Facility: CLINIC | Age: 21
End: 2021-10-04

## 2021-10-04 VITALS
TEMPERATURE: 97.8 F | SYSTOLIC BLOOD PRESSURE: 108 MMHG | WEIGHT: 159 LBS | DIASTOLIC BLOOD PRESSURE: 72 MMHG | HEART RATE: 73 BPM | HEIGHT: 71 IN | OXYGEN SATURATION: 98 % | BODY MASS INDEX: 22.26 KG/M2

## 2021-10-04 DIAGNOSIS — F63.9 IMPULSE CONTROL DISORDER: ICD-10-CM

## 2021-10-04 DIAGNOSIS — F39 MOOD DISORDER IN FULL REMISSION (HCC): ICD-10-CM

## 2021-10-04 DIAGNOSIS — F84.0 HISTORY OF AUTISM SPECTRUM DISORDER: ICD-10-CM

## 2021-10-04 DIAGNOSIS — Z79.899 MEDICATION MANAGEMENT: ICD-10-CM

## 2021-10-04 DIAGNOSIS — R41.83 BORDERLINE INTELLECTUAL FUNCTIONING: ICD-10-CM

## 2021-10-04 DIAGNOSIS — F51.05 INSOMNIA DUE TO ANXIETY AND FEAR: Primary | ICD-10-CM

## 2021-10-04 DIAGNOSIS — F40.9 INSOMNIA DUE TO ANXIETY AND FEAR: Primary | ICD-10-CM

## 2021-10-04 PROCEDURE — 99214 OFFICE O/P EST MOD 30 MIN: CPT | Performed by: NURSE PRACTITIONER

## 2021-10-04 RX ORDER — PAROXETINE HYDROCHLORIDE HEMIHYDRATE 37.5 MG/1
37.5 TABLET, FILM COATED, EXTENDED RELEASE ORAL DAILY
Qty: 30 TABLET | Refills: 1 | Status: SHIPPED | OUTPATIENT
Start: 2021-10-04 | End: 2021-11-01 | Stop reason: SDUPTHER

## 2021-10-04 RX ORDER — ZIPRASIDONE HYDROCHLORIDE 40 MG/1
40 CAPSULE ORAL EVERY EVENING
Qty: 30 CAPSULE | Refills: 1 | Status: SHIPPED | OUTPATIENT
Start: 2021-10-04 | End: 2021-11-01 | Stop reason: SDUPTHER

## 2021-10-04 RX ORDER — DIVALPROEX SODIUM 500 MG/1
500 TABLET, EXTENDED RELEASE ORAL 2 TIMES DAILY
Qty: 60 TABLET | Refills: 1 | Status: SHIPPED | OUTPATIENT
Start: 2021-10-04 | End: 2021-11-01 | Stop reason: SDUPTHER

## 2021-10-04 RX ORDER — HYDROXYZINE PAMOATE 25 MG/1
25-50 CAPSULE ORAL DAILY PRN
Qty: 60 CAPSULE | Refills: 1 | Status: SHIPPED | OUTPATIENT
Start: 2021-10-04 | End: 2021-12-10

## 2021-10-04 NOTE — PROGRESS NOTES
"Subjective   Ismael Sal Jr. is a 20 y.o. male who arrives with his mother, Rebekah Sal, today for his  follow up visit  for medication management.       Chief Complaint:  Insomnia,night bauer      History of Present Illness: Mom states patient told her 1 week ago he has been seeing demons and having demonic nightmares for the last several months which  make it difficult for him to fall asleep and stay asleep. Patient later states believes they may have started in 2019.  Patient relates these nightmares and demonic images  to his sister dating a man who was a witch and put a hex on her.  Ismael states he visualizes the same things  his sister sees. He experiences demonic nightmares on average 3 times weekly. The demons  \"get sexual\" with him. The nightmares increase his anxiety and make it difficult for him to fall asleep, even with vistaril 25 mg. Mom states he told her he woke up with a demon holding him by the throat.  Visualizes demons during the day only when triggered by demonic music or images.  Patient states he is unable to watch certain movies because  \"it hits too close to home.\" Tells provider he had a burn a movie once due to it triggering demonic experiences. States the song in the movie,  \"Insidious\" is a  trigger.  Patient denies problems with appetite.  Eats twice daily.  Denies feeling depressed.  Anxiety related to seeing demonic images.  Otherwise denies anxiety Mom reports behavior is \"all right.\" No other concerns.  He is to see his therapist Gail weekly.  Reports he told her about his demonic experiences and she encouraged him to share again today.    The following portions of the patient's history were reviewed and updated as appropriate: allergies, current medications, past family history, past medical history, past social history, past surgical history and problem list.    Problem List:  Patient Active Problem List   Diagnosis   • Bipolar disorder (HCC)   • Depression with suicidal " "ideation   • MDD (major depressive disorder)   • Impulse control disorder     Allergy:   Allergies   Allergen Reactions   • Bee Venom Hives   • Sulfa Antibiotics Hives      Current Medications:   Current Outpatient Medications   Medication Sig Dispense Refill   • divalproex (DEPAKOTE ER) 500 MG 24 hr tablet Take 1 tablet by mouth 2 (two) times a day. 60 tablet 1   • hydrOXYzine pamoate (VISTARIL) 25 MG capsule TAKE 1-2 CAPSULE TWO TIMES A DAY AS NEEDED ANXIETY INSOMNIA MAY CAUSE DROWSINESS  60 capsule 0   • PARoxetine (PAXIL) 40 MG tablet Take 1 tablet by mouth every night at bedtime. 30 tablet 1   • ziprasidone (GEODON) 40 MG capsule Take 1 capsule by mouth Every Evening. With Food 30 capsule 1     No current facility-administered medications for this visit.     Review of Symptoms:    Review of Systems   Allergic/Immunologic: Positive for environmental allergies.   Neurological: Positive for memory problem.   Psychiatric/Behavioral: Positive for decreased concentration, sleep disturbance and stress. Negative for agitation, behavioral problems, hallucinations, self-injury, suicidal ideas and depressed mood. The patient is not nervous/anxious.    All other systems reviewed and are negative.    Physical Exam:   .Physical Exam  Vitals reviewed.   Constitutional:       Appearance: Normal appearance. He is normal weight.   Neurological:      Mental Status: He is alert.      Gait: Gait is intact.   Psychiatric:         Attention and Perception: Attention normal.         Mood and Affect: Mood normal.         Speech: Speech normal.         Behavior: Behavior is cooperative.         Thought Content: Thought content is not paranoid. Thought content does not include homicidal or suicidal ideation.         Cognition and Memory: Cognition is impaired. Memory is impaired.         Judgment: Judgment is impulsive.       Blood pressure 108/72, pulse 73, temperature 97.8 °F (36.6 °C), height 180.3 cm (70.98\"), weight 72.1 kg (159 " lb), SpO2 98 %.  Body mass index is 22.19 kg/m².    Appearance: Ismael is a 20 year old  adolescent male. He appears clean, well nourished, casually dressed. BMI 22.19.     Gait, Station, Strength: posture erect, gait steady.  No signs of impairment, acute distress, abnormal muscle movements, motor tics or tremors    Mental Status Exam:   Hygiene:   good  Cooperation: Evasive;  Initially hesitant to discuss demonic nightmares.  Became more engaged as time progressed  Eye Contact:  Fair  Psychomotor Behavior:  Appropriate  Affect:  Appropriate  Mood: normal  Hopelessness: Denies  Speech:  Normal  Thought Process:  appropriately answers providers questions  Thought Content:  Bizarre  Suicidal:  None  Homicidal:  None  Hallucinations:  Visual intermittent; sees demons when triggered by demonic music/images  Delusion:  None  Memory:  Deficits  Orientation:  Person, Place, Time and Situation  Reliability:  fair  Insight:  Fair  Judgement:  Impaired  Impulse Control:  Impaired  Physical/Medical Issues:  No      PHQ  .Patient scored a 4 on his PHQ-9  on 10/4/2021. Follow-up recommendations include: Prescribed antidepressant medication treatment.      .Diagnoses and all orders for this visit:    1. Insomnia due to anxiety and fear (Primary)  -     hydrOXYzine pamoate (VISTARIL) 25 MG capsule; Take 1-2 capsules by mouth Daily As Needed for Anxiety (insomnia) for up to 60 days.  Dispense: 60 capsule; Refill: 1  -     ziprasidone (GEODON) 40 MG capsule; Take 1 capsule by mouth Every Evening. With Food  Dispense: 30 capsule; Refill: 1    2. Impulse control disorder   -     ziprasidone (GEODON) 40 MG capsule; Take 1 capsule by mouth Every Evening. With Food  Dispense: 30 capsule; Refill: 1  -     divalproex (DEPAKOTE ER) 500 MG 24 hr tablet; Take 1 tablet by mouth 2 (two) times a day.  Dispense: 60 tablet; Refill: 1    3. Borderline intellectual functioning    4. History of autism spectrum disorder  -     divalproex  (DEPAKOTE ER) 500 MG 24 hr tablet; Take 1 tablet by mouth 2 (two) times a day.  Dispense: 60 tablet; Refill: 1    5. Mood disorder in full remission (HCC)  -     ziprasidone (GEODON) 40 MG capsule; Take 1 capsule by mouth Every Evening. With Food  Dispense: 30 capsule; Refill: 1  -     divalproex (DEPAKOTE ER) 500 MG 24 hr tablet; Take 1 tablet by mouth 2 (two) times a day.  Dispense: 60 tablet; Refill: 1    6. Medication management    Other orders  -     PARoxetine CR (Paxil CR) 37.5 MG 24 hr tablet; Take 1 tablet by mouth Daily.  Dispense: 30 tablet; Refill: 1    Patient takes medication as prescribed and this not noticed any side effects.  Reports demonic nightmares increase anxiety and insomnia.Visualizes demons during the day when triggered. States he saw one playing with his nephew one day. Patient describes event as being related to past event versus a psychotic process. Patient treatment options discussed.Switch Paxil to 37.5 CR and increase Vistaril to 50 mg at night for insomnia.  Likely taper and switch Paxil next visit if anxiety and insomnia remains problematic    -Switch Paxil 40 mg to Paxil CR 37.5 mg daily  -Increase hydroxyzine pamoate to 25-50 mg at night for insomnia  -Continue Geodon 40 mg at night  -Continue Depakote 500 mg twice daily  -Labs received from PCP-TSH 1.99, free T4 0.99, T3 uptake 32 point 8/5/2021.  Mom states PCP performed EKG.  Not received  -Reports reviewed include Mau report, notes  -Ismael is a non-smoker    Visit Diagnoses:    ICD-10-CM ICD-9-CM   1. Insomnia due to anxiety and fear  F51.05 300.20    F40.9 327.02   2. Impulse control disorder   F63.9 312.30   3. Borderline intellectual functioning  R41.83 V62.89   4. History of autism spectrum disorder  F84.0 299.00   5. Mood disorder in full remission (HCC)  F39 296.99   6. Medication management  Z79.899 V58.69     TREATMENT PLAN/GOALS:   Short Term  1. Pt will keep all appts for med mgt and psychotherapy  2. Pt will take  medications as prescribed and report intolerable side effects    Long term:   1. Pt will exhibit emotional stability  2. Patient will be less impulsive    MEDICATION ISSUES:  Discussed medication options and treatment plan of prescribed medication as well as the risks, benefits, and side effects including potential falls, possible impaired driving and metabolic adversities among others. Patient/Mom is agreeable to call the office with any worsening of symptoms or onset of side effects. Mom is agreeable to call 911 or go to the nearest ER should he  begin having SI/HI.     MEDS ORDERED DURING VISIT:  New Medications Ordered This Visit   Medications   • PARoxetine CR (Paxil CR) 37.5 MG 24 hr tablet     Sig: Take 1 tablet by mouth Daily.     Dispense:  30 tablet     Refill:  1   • hydrOXYzine pamoate (VISTARIL) 25 MG capsule     Sig: Take 1-2 capsules by mouth Daily As Needed for Anxiety (insomnia) for up to 60 days.     Dispense:  60 capsule     Refill:  1   • ziprasidone (GEODON) 40 MG capsule     Sig: Take 1 capsule by mouth Every Evening. With Food     Dispense:  30 capsule     Refill:  1   • divalproex (DEPAKOTE ER) 500 MG 24 hr tablet     Sig: Take 1 tablet by mouth 2 (two) times a day.     Dispense:  60 tablet     Refill:  1     Quantity corrected     Return in about 4 weeks (around 11/1/2021).           This document has been electronically signed by IDA Perez   October 4, 2021 09:02 EDT    Part of this note may be an electronic transcription/translation of spoken language to printed text using the Dragon Dictation System.

## 2021-11-01 ENCOUNTER — OFFICE VISIT (OUTPATIENT)
Dept: PSYCHIATRY | Facility: CLINIC | Age: 21
End: 2021-11-01

## 2021-11-01 VITALS
WEIGHT: 162 LBS | SYSTOLIC BLOOD PRESSURE: 106 MMHG | HEART RATE: 50 BPM | DIASTOLIC BLOOD PRESSURE: 67 MMHG | BODY MASS INDEX: 22.68 KG/M2 | HEIGHT: 71 IN

## 2021-11-01 DIAGNOSIS — F63.9 IMPULSE CONTROL DISORDER: ICD-10-CM

## 2021-11-01 DIAGNOSIS — F51.05 INSOMNIA DUE TO ANXIETY AND FEAR: Primary | ICD-10-CM

## 2021-11-01 DIAGNOSIS — F84.0 HISTORY OF AUTISM SPECTRUM DISORDER: ICD-10-CM

## 2021-11-01 DIAGNOSIS — R41.83 BORDERLINE INTELLECTUAL FUNCTIONING: ICD-10-CM

## 2021-11-01 DIAGNOSIS — Z79.899 MEDICATION MANAGEMENT: ICD-10-CM

## 2021-11-01 DIAGNOSIS — F39 MOOD DISORDER IN FULL REMISSION (HCC): ICD-10-CM

## 2021-11-01 DIAGNOSIS — F40.9 INSOMNIA DUE TO ANXIETY AND FEAR: Primary | ICD-10-CM

## 2021-11-01 PROCEDURE — 99214 OFFICE O/P EST MOD 30 MIN: CPT | Performed by: NURSE PRACTITIONER

## 2021-11-01 RX ORDER — PAROXETINE HYDROCHLORIDE HEMIHYDRATE 37.5 MG/1
37.5 TABLET, FILM COATED, EXTENDED RELEASE ORAL DAILY
Qty: 30 TABLET | Refills: 1 | Status: SHIPPED | OUTPATIENT
Start: 2021-11-01

## 2021-11-01 RX ORDER — ZIPRASIDONE HYDROCHLORIDE 40 MG/1
40 CAPSULE ORAL EVERY EVENING
Qty: 30 CAPSULE | Refills: 1 | Status: SHIPPED | OUTPATIENT
Start: 2021-11-01

## 2021-11-01 RX ORDER — DIVALPROEX SODIUM 500 MG/1
500 TABLET, EXTENDED RELEASE ORAL 2 TIMES DAILY
Qty: 60 TABLET | Refills: 1 | Status: SHIPPED | OUTPATIENT
Start: 2021-11-01

## 2021-11-01 NOTE — PROGRESS NOTES
Subjective   Ismael Sal Jr. is a 20 y.o. male who arrives with his mother, Rebekah Sal, today for his  follow up visit  for medication management.       Chief Complaint:  F/u for insomnia, nightmares, AVH     History of Present Illness: Ismael reports having NM 3 nights in a row 2 weeks ago. Otherwise, nightmares resolved. Takes hydroxyzine 50 mg every night to fall asleep. Average sleep duration 8-9 hours. Patient denies having any more Demonic AVH since last visit.  Patient denies feeling anxious or depressed.  States she noticed some improvement in his attitude.Mom states he slows down to listen, instead of argues.  Mom denies additional concerns or complaints    The following portions of the patient's history were reviewed and updated as appropriate: allergies, current medications, past family history, past medical history, past social history, past surgical history and problem list.    Problem List:  Patient Active Problem List   Diagnosis   • Bipolar disorder (HCC)   • Depression with suicidal ideation   • MDD (major depressive disorder)   • Impulse control disorder     Allergy:   Allergies   Allergen Reactions   • Bee Venom Hives   • Sulfa Antibiotics Hives      Current Medications:   Current Outpatient Medications   Medication Sig Dispense Refill   • divalproex (DEPAKOTE ER) 500 MG 24 hr tablet Take 1 tablet by mouth 2 (two) times a day. 60 tablet 1   • hydrOXYzine pamoate (VISTARIL) 25 MG capsule Take 1-2 capsules by mouth Daily As Needed for Anxiety (insomnia) for up to 60 days. 60 capsule 1   • PARoxetine CR (Paxil CR) 37.5 MG 24 hr tablet Take 1 tablet by mouth Daily. 30 tablet 1   • ziprasidone (GEODON) 40 MG capsule Take 1 capsule by mouth Every Evening. With Food 30 capsule 1     No current facility-administered medications for this visit.     Review of Symptoms:    Review of Systems   Allergic/Immunologic: Positive for environmental allergies.   Neurological: Positive for memory problem.  "  Psychiatric/Behavioral: Positive for decreased concentration, sleep disturbance and stress. Negative for agitation, behavioral problems, hallucinations, self-injury, suicidal ideas and depressed mood. The patient is not nervous/anxious.    All other systems reviewed and are negative.    Physical Exam:   .Physical Exam  Vitals reviewed.   Constitutional:       Appearance: Normal appearance. He is normal weight.   Neurological:      Mental Status: He is alert.      Gait: Gait is intact.   Psychiatric:         Attention and Perception: Attention normal.         Mood and Affect: Mood normal.         Speech: Speech normal.         Behavior: Behavior is cooperative.         Thought Content: Thought content is not paranoid. Thought content does not include homicidal or suicidal ideation.         Cognition and Memory: Cognition is impaired. Memory is impaired.         Judgment: Judgment is impulsive.       Blood pressure 106/67, pulse 50, height 180.3 cm (70.98\"), weight 73.5 kg (162 lb).  Body mass index is 22.6 kg/m².    Appearance: Ismael is a 20 year old  adolescent male. He appears clean, well nourished, casually dressed. BMI 22.60     Gait, Station, Strength: posture erect, gait steady.  No signs of impairment, acute distress, abnormal muscle movements, motor tics or tremors    Mental Status Exam:   Hygiene:   good  Cooperation:   Cooperative  Eye Contact:  Good  Psychomotor Behavior:  Appropriate  Affect:  Appropriate  Mood: normal  Hopelessness: Denies  Speech:  Minimal  Thought Process:  appropriately answers providers questions  Thought Content:  Appears normal based on responses  Suicidal:  None  Homicidal:  None  Hallucinations:  None     Delusion:  None  Memory:  Deficits  Orientation:  Person, Place, Time and Situation  Reliability:  fair  Insight:  Fair  Judgement:  Impaired  Impulse Control:  Impaired  Physical/Medical Issues:  No      PHQ  .Patient scored a 4 on his PHQ-9  on 10/4/2021. Follow-up " recommendations include: Prescribed antidepressant medication treatment.      .Diagnoses and all orders for this visit:    1. Insomnia due to anxiety and fear-improved (Primary)  Comments:  Continue Vistaril 25-50 mg daily as needed  Orders:  -     divalproex (DEPAKOTE ER) 500 MG 24 hr tablet; Take 1 tablet by mouth 2 (Two) Times a Day.  Dispense: 60 tablet; Refill: 1  -     ziprasidone (GEODON) 40 MG capsule; Take 1 capsule by mouth Every Evening. With Food  Dispense: 30 capsule; Refill: 1    2. Borderline intellectual functioning    3. History of autism spectrum disorder  -     divalproex (DEPAKOTE ER) 500 MG 24 hr tablet; Take 1 tablet by mouth 2 (Two) Times a Day.  Dispense: 60 tablet; Refill: 1    4. Mood disorder in full remission (HCC)  -     divalproex (DEPAKOTE ER) 500 MG 24 hr tablet; Take 1 tablet by mouth 2 (Two) Times a Day.  Dispense: 60 tablet; Refill: 1  -     PARoxetine CR (Paxil CR) 37.5 MG 24 hr tablet; Take 1 tablet by mouth Daily.  Dispense: 30 tablet; Refill: 1  -     ziprasidone (GEODON) 40 MG capsule; Take 1 capsule by mouth Every Evening. With Food  Dispense: 30 capsule; Refill: 1    5. Impulse control disorder   -     divalproex (DEPAKOTE ER) 500 MG 24 hr tablet; Take 1 tablet by mouth 2 (Two) Times a Day.  Dispense: 60 tablet; Refill: 1  -     ziprasidone (GEODON) 40 MG capsule; Take 1 capsule by mouth Every Evening. With Food  Dispense: 30 capsule; Refill: 1    6. Medication management    Patient takes medication as prescribed and denies side effects.  Reports resolution of  demonic nightmares and AVH.  Denies feeling anxious or depressed.  Reports sleep and appetite are both good.  Mom reports improvement in attitude.  I find medications effective and prefer no changes be made at this time.  His heart rate 50.  Will request EKG from PCP    -Continue Paxil CR 37.5 mg daily for anxiety  -Continue  hydroxyzine pamoate to 25-50 mg at night for insomnia  -Continue Geodon 40 mg at  night  -Continue Depakote 500 mg twice daily  -Monitor heart rate.  EKG requested from PCP.   -Reports reviewed include [facility] report, notes  -Ismael is a non-smoker    Visit Diagnoses:    ICD-10-CM ICD-9-CM   1. Insomnia due to anxiety and fear-improved  F51.05 300.20    F40.9 327.02   2. Borderline intellectual functioning  R41.83 V62.89   3. History of autism spectrum disorder  F84.0 299.00   4. Mood disorder in full remission ([facility])  F39 296.99   5. Impulse control disorder   F63.9 312.30   6. Medication management  Z79.899 V58.69     TREATMENT PLAN/GOALS:   Short Term  1. Pt will keep all appts for med mgt and psychotherapy  2. Pt will take medications as prescribed and report intolerable side effects    Long term:   1. Pt will exhibit emotional stability  2. Patient will be less impulsive    MEDICATION ISSUES:  Discussed medication options and treatment plan of prescribed medication as well as the risks, benefits, and side effects including potential falls, possible impaired driving and metabolic adversities among others. Patient/Mom is agreeable to call the office with any worsening of symptoms or onset of side effects. Mom is agreeable to call 911 or go to the nearest ER should he  begin having SI/HI.     MEDS ORDERED DURING VISIT:  New Medications Ordered This Visit   Medications   • divalproex (DEPAKOTE ER) 500 MG 24 hr tablet     Sig: Take 1 tablet by mouth 2 (Two) Times a Day.     Dispense:  60 tablet     Refill:  1   • PARoxetine CR (Paxil CR) 37.5 MG 24 hr tablet     Sig: Take 1 tablet by mouth Daily.     Dispense:  30 tablet     Refill:  1   • ziprasidone (GEODON) 40 MG capsule     Sig: Take 1 capsule by mouth Every Evening. With Food     Dispense:  30 capsule     Refill:  1     Return in about 2 months (around 1/1/2022).         This document has been electronically signed by IDA Perez   November 1, 2021 08:45 EDT    Part of this note may be an electronic transcription/translation of  spoken language to printed text using the Dragon Dictation System.

## 2021-12-09 DIAGNOSIS — F40.9 INSOMNIA DUE TO ANXIETY AND FEAR: ICD-10-CM

## 2021-12-09 DIAGNOSIS — F51.05 INSOMNIA DUE TO ANXIETY AND FEAR: ICD-10-CM

## 2021-12-10 RX ORDER — HYDROXYZINE PAMOATE 25 MG/1
CAPSULE ORAL
Qty: 60 CAPSULE | Refills: 1 | Status: SHIPPED | OUTPATIENT
Start: 2021-12-10

## 2022-01-13 DIAGNOSIS — F51.05 INSOMNIA DUE TO ANXIETY AND FEAR: ICD-10-CM

## 2022-01-13 DIAGNOSIS — F40.9 INSOMNIA DUE TO ANXIETY AND FEAR: ICD-10-CM

## 2022-01-14 RX ORDER — HYDROXYZINE PAMOATE 25 MG/1
CAPSULE ORAL
Qty: 60 CAPSULE | Refills: 1 | OUTPATIENT
Start: 2022-01-14

## 2022-01-19 DIAGNOSIS — F39 MOOD DISORDER IN FULL REMISSION: ICD-10-CM

## 2022-01-19 RX ORDER — PAROXETINE HYDROCHLORIDE HEMIHYDRATE 37.5 MG/1
TABLET, FILM COATED, EXTENDED RELEASE ORAL
Qty: 30 TABLET | Refills: 1 | OUTPATIENT
Start: 2022-01-19